# Patient Record
Sex: MALE | Race: OTHER | NOT HISPANIC OR LATINO | URBAN - METROPOLITAN AREA
[De-identification: names, ages, dates, MRNs, and addresses within clinical notes are randomized per-mention and may not be internally consistent; named-entity substitution may affect disease eponyms.]

---

## 2019-09-01 ENCOUNTER — OUTPATIENT (OUTPATIENT)
Dept: OUTPATIENT SERVICES | Facility: HOSPITAL | Age: 64
LOS: 1 days | End: 2019-09-01
Payer: MEDICAID

## 2019-09-01 PROCEDURE — G9001: CPT

## 2019-09-04 ENCOUNTER — EMERGENCY (EMERGENCY)
Facility: HOSPITAL | Age: 64
LOS: 1 days | Discharge: HOME | End: 2019-09-04
Attending: STUDENT IN AN ORGANIZED HEALTH CARE EDUCATION/TRAINING PROGRAM | Admitting: STUDENT IN AN ORGANIZED HEALTH CARE EDUCATION/TRAINING PROGRAM
Payer: MEDICAID

## 2019-09-04 VITALS — DIASTOLIC BLOOD PRESSURE: 90 MMHG | HEART RATE: 78 BPM | RESPIRATION RATE: 16 BRPM | SYSTOLIC BLOOD PRESSURE: 149 MMHG

## 2019-09-04 VITALS
DIASTOLIC BLOOD PRESSURE: 66 MMHG | SYSTOLIC BLOOD PRESSURE: 105 MMHG | TEMPERATURE: 98 F | HEART RATE: 72 BPM | OXYGEN SATURATION: 99 % | RESPIRATION RATE: 17 BRPM

## 2019-09-04 LAB
ALBUMIN SERPL ELPH-MCNC: 4.6 G/DL — SIGNIFICANT CHANGE UP (ref 3.3–5)
ALP SERPL-CCNC: 196 U/L — HIGH (ref 40–120)
ALT FLD-CCNC: 141 U/L — HIGH (ref 4–41)
AMPHET UR-MCNC: POSITIVE — SIGNIFICANT CHANGE UP
ANION GAP SERPL CALC-SCNC: 13 MMO/L — SIGNIFICANT CHANGE UP (ref 7–14)
APAP SERPL-MCNC: < 15 UG/ML — LOW (ref 15–25)
APPEARANCE UR: CLEAR — SIGNIFICANT CHANGE UP
AST SERPL-CCNC: 86 U/L — HIGH (ref 4–40)
BACTERIA # UR AUTO: SIGNIFICANT CHANGE UP
BARBITURATES UR SCN-MCNC: POSITIVE — SIGNIFICANT CHANGE UP
BASE EXCESS BLDV CALC-SCNC: 5 MMOL/L — SIGNIFICANT CHANGE UP
BASOPHILS # BLD AUTO: 0.05 K/UL — SIGNIFICANT CHANGE UP (ref 0–0.2)
BASOPHILS NFR BLD AUTO: 0.7 % — SIGNIFICANT CHANGE UP (ref 0–2)
BENZODIAZ UR-MCNC: NEGATIVE — SIGNIFICANT CHANGE UP
BILIRUB SERPL-MCNC: < 0.2 MG/DL — LOW (ref 0.2–1.2)
BILIRUB UR-MCNC: NEGATIVE — SIGNIFICANT CHANGE UP
BLOOD GAS VENOUS - CREATININE: 1.07 MG/DL — SIGNIFICANT CHANGE UP (ref 0.5–1.3)
BLOOD UR QL VISUAL: HIGH
BUN SERPL-MCNC: 24 MG/DL — HIGH (ref 7–23)
CALCIUM SERPL-MCNC: 10 MG/DL — SIGNIFICANT CHANGE UP (ref 8.4–10.5)
CANNABINOIDS UR-MCNC: NEGATIVE — SIGNIFICANT CHANGE UP
CHLORIDE BLDV-SCNC: 103 MMOL/L — SIGNIFICANT CHANGE UP (ref 96–108)
CHLORIDE SERPL-SCNC: 99 MMOL/L — SIGNIFICANT CHANGE UP (ref 98–107)
CO2 SERPL-SCNC: 28 MMOL/L — SIGNIFICANT CHANGE UP (ref 22–31)
COCAINE METAB.OTHER UR-MCNC: NEGATIVE — SIGNIFICANT CHANGE UP
COLOR SPEC: YELLOW — SIGNIFICANT CHANGE UP
CREAT SERPL-MCNC: 1.08 MG/DL — SIGNIFICANT CHANGE UP (ref 0.5–1.3)
EOSINOPHIL # BLD AUTO: 0.18 K/UL — SIGNIFICANT CHANGE UP (ref 0–0.5)
EOSINOPHIL NFR BLD AUTO: 2.4 % — SIGNIFICANT CHANGE UP (ref 0–6)
ETHANOL BLD-MCNC: < 10 MG/DL — SIGNIFICANT CHANGE UP
GAS PNL BLDV: 138 MMOL/L — SIGNIFICANT CHANGE UP (ref 136–146)
GLUCOSE BLDV-MCNC: 93 MG/DL — SIGNIFICANT CHANGE UP (ref 70–99)
GLUCOSE SERPL-MCNC: 91 MG/DL — SIGNIFICANT CHANGE UP (ref 70–99)
GLUCOSE UR-MCNC: NEGATIVE — SIGNIFICANT CHANGE UP
HCO3 BLDV-SCNC: 26 MMOL/L — SIGNIFICANT CHANGE UP (ref 20–27)
HCT VFR BLD CALC: 41.1 % — SIGNIFICANT CHANGE UP (ref 39–50)
HCT VFR BLDV CALC: 44.7 % — SIGNIFICANT CHANGE UP (ref 39–51)
HGB BLD-MCNC: 13 G/DL — SIGNIFICANT CHANGE UP (ref 13–17)
HGB BLDV-MCNC: 14.6 G/DL — SIGNIFICANT CHANGE UP (ref 13–17)
HYALINE CASTS # UR AUTO: NEGATIVE — SIGNIFICANT CHANGE UP
IMM GRANULOCYTES NFR BLD AUTO: 0.3 % — SIGNIFICANT CHANGE UP (ref 0–1.5)
KETONES UR-MCNC: NEGATIVE — SIGNIFICANT CHANGE UP
LACTATE BLDV-MCNC: 1.8 MMOL/L — SIGNIFICANT CHANGE UP (ref 0.5–2)
LEUKOCYTE ESTERASE UR-ACNC: NEGATIVE — SIGNIFICANT CHANGE UP
LYMPHOCYTES # BLD AUTO: 1.76 K/UL — SIGNIFICANT CHANGE UP (ref 1–3.3)
LYMPHOCYTES # BLD AUTO: 23.4 % — SIGNIFICANT CHANGE UP (ref 13–44)
MCHC RBC-ENTMCNC: 30.2 PG — SIGNIFICANT CHANGE UP (ref 27–34)
MCHC RBC-ENTMCNC: 31.6 % — LOW (ref 32–36)
MCV RBC AUTO: 95.6 FL — SIGNIFICANT CHANGE UP (ref 80–100)
METHADONE UR-MCNC: NEGATIVE — SIGNIFICANT CHANGE UP
MONOCYTES # BLD AUTO: 0.39 K/UL — SIGNIFICANT CHANGE UP (ref 0–0.9)
MONOCYTES NFR BLD AUTO: 5.2 % — SIGNIFICANT CHANGE UP (ref 2–14)
NEUTROPHILS # BLD AUTO: 5.13 K/UL — SIGNIFICANT CHANGE UP (ref 1.8–7.4)
NEUTROPHILS NFR BLD AUTO: 68 % — SIGNIFICANT CHANGE UP (ref 43–77)
NITRITE UR-MCNC: NEGATIVE — SIGNIFICANT CHANGE UP
NRBC # FLD: 0 K/UL — SIGNIFICANT CHANGE UP (ref 0–0)
OPIATES UR-MCNC: NEGATIVE — SIGNIFICANT CHANGE UP
OXYCODONE UR-MCNC: NEGATIVE — SIGNIFICANT CHANGE UP
PCO2 BLDV: 57 MMHG — HIGH (ref 41–51)
PCP UR-MCNC: NEGATIVE — SIGNIFICANT CHANGE UP
PH BLDV: 7.35 PH — SIGNIFICANT CHANGE UP (ref 7.32–7.43)
PH UR: 6 — SIGNIFICANT CHANGE UP (ref 5–8)
PLATELET # BLD AUTO: 224 K/UL — SIGNIFICANT CHANGE UP (ref 150–400)
PMV BLD: 11.1 FL — SIGNIFICANT CHANGE UP (ref 7–13)
PO2 BLDV: < 24 MMHG — LOW (ref 35–40)
POTASSIUM BLDV-SCNC: 4.2 MMOL/L — SIGNIFICANT CHANGE UP (ref 3.4–4.5)
POTASSIUM SERPL-MCNC: 4.4 MMOL/L — SIGNIFICANT CHANGE UP (ref 3.5–5.3)
POTASSIUM SERPL-SCNC: 4.4 MMOL/L — SIGNIFICANT CHANGE UP (ref 3.5–5.3)
PROT SERPL-MCNC: 8.6 G/DL — HIGH (ref 6–8.3)
PROT UR-MCNC: 30 — SIGNIFICANT CHANGE UP
RBC # BLD: 4.3 M/UL — SIGNIFICANT CHANGE UP (ref 4.2–5.8)
RBC # FLD: 13.8 % — SIGNIFICANT CHANGE UP (ref 10.3–14.5)
RBC CASTS # UR COMP ASSIST: >50 — HIGH (ref 0–?)
SALICYLATES SERPL-MCNC: < 5 MG/DL — LOW (ref 15–30)
SAO2 % BLDV: 34.9 % — LOW (ref 60–85)
SODIUM SERPL-SCNC: 140 MMOL/L — SIGNIFICANT CHANGE UP (ref 135–145)
SP GR SPEC: 1.03 — SIGNIFICANT CHANGE UP (ref 1–1.04)
SQUAMOUS # UR AUTO: SIGNIFICANT CHANGE UP
T3 SERPL-MCNC: 54.2 NG/DL — LOW (ref 80–200)
T4 AB SER-ACNC: 4.73 UG/DL — LOW (ref 5.1–13)
TSH SERPL-MCNC: 2.63 UIU/ML — SIGNIFICANT CHANGE UP (ref 0.27–4.2)
UROBILINOGEN FLD QL: SIGNIFICANT CHANGE UP
WBC # BLD: 7.53 K/UL — SIGNIFICANT CHANGE UP (ref 3.8–10.5)
WBC # FLD AUTO: 7.53 K/UL — SIGNIFICANT CHANGE UP (ref 3.8–10.5)
WBC UR QL: SIGNIFICANT CHANGE UP (ref 0–?)

## 2019-09-04 PROCEDURE — 76937 US GUIDE VASCULAR ACCESS: CPT | Mod: 26,RT

## 2019-09-04 PROCEDURE — 71045 X-RAY EXAM CHEST 1 VIEW: CPT | Mod: 26

## 2019-09-04 PROCEDURE — 70450 CT HEAD/BRAIN W/O DYE: CPT | Mod: 26

## 2019-09-04 PROCEDURE — 99285 EMERGENCY DEPT VISIT HI MDM: CPT | Mod: 25

## 2019-09-04 RX ORDER — OLANZAPINE 15 MG/1
1 TABLET, FILM COATED ORAL
Qty: 5 | Refills: 0
Start: 2019-09-04 | End: 2019-09-08

## 2019-09-04 RX ORDER — QUETIAPINE FUMARATE 200 MG/1
2 TABLET, FILM COATED ORAL
Qty: 20 | Refills: 0
Start: 2019-09-04 | End: 2019-09-08

## 2019-09-04 RX ORDER — HALOPERIDOL DECANOATE 100 MG/ML
1 INJECTION INTRAMUSCULAR
Qty: 15 | Refills: 0
Start: 2019-09-04 | End: 2019-09-08

## 2019-09-04 RX ORDER — HALOPERIDOL DECANOATE 100 MG/ML
5 INJECTION INTRAMUSCULAR ONCE
Refills: 0 | Status: COMPLETED | OUTPATIENT
Start: 2019-09-04 | End: 2019-09-04

## 2019-09-04 RX ORDER — TRAZODONE HCL 50 MG
1 TABLET ORAL
Qty: 5 | Refills: 0
Start: 2019-09-04 | End: 2019-09-08

## 2019-09-04 RX ORDER — MIRTAZAPINE 45 MG/1
1 TABLET, ORALLY DISINTEGRATING ORAL
Qty: 5 | Refills: 0
Start: 2019-09-04 | End: 2019-09-08

## 2019-09-04 RX ADMIN — Medication 1 MILLIGRAM(S): at 14:55

## 2019-09-04 RX ADMIN — HALOPERIDOL DECANOATE 5 MILLIGRAM(S): 100 INJECTION INTRAMUSCULAR at 14:55

## 2019-09-04 NOTE — ED PROVIDER NOTE - NSFOLLOWUPINSTRUCTIONS_ED_ALL_ED_FT
Please follow up with geriatric psychiatry in the next few days. Call the number provided to you to make an appointment.    Continue all home medications as prescribed.    Return to the ED for any worsening symptoms of chest pain, difficulty breathing, thoughts of hurting yourself or others, or any new or concerning symptoms.    Please read all attached.

## 2019-09-04 NOTE — ED ADULT NURSE NOTE - CHIEF COMPLAINT QUOTE
Pt went to appt at [facility], unable to be seen since he is not pt of [facility], needs full medical eval and clearance. PMH bipolar, HTN, DM, CVAx2, nonverbal at baseline. At baseline mental status as per wife. No complaints.

## 2019-09-04 NOTE — ED PROVIDER NOTE - NS ED ROS FT
General: no fevers  Head: no injury  Eyes: no eye redness  ENT: no nasal discharge/congestion, +left ear swelling with negative work up  CV: no cyanosis  Resp: no SOB, no cough  GI: no N/V  : normal urine output  MSK: no bone deformities  Skin: no new rash  Neuro: no focal weakness

## 2019-09-04 NOTE — ED PROVIDER NOTE - OBJECTIVE STATEMENT
62yo man PMH dementia, schizophrenia, bipolar, hypothyroidism, CVA, HTN, HLD, DM with PEG tube in place due to dysphagia was brought into ED by wife for medical evaluation as she would like pt to be evaluated by psychiatry. He is nonverbal at baseline and pt's wife is at bedside and reports that he is at baseline. He has been having more aggressive behavior and has poor sleep. Pt had been treated by getriatrician with psychiatric medications but was told that he would need a psychiatrist and has not been prescribing psych medications. She normally takes pt to [facility] but was referred to [facility] and [place] she went [place] [facility] [place] told that he would need a full medication evaluation prior to being seen. Pt's wife denies noticing any recent fevers, cough, n/v, rash.

## 2019-09-04 NOTE — ED ADULT NURSE NOTE - NSIMPLEMENTINTERV_GEN_ALL_ED
Implemented All Fall Risk Interventions:  [place] to call system. Call bell, personal items and telephone within reach. Instruct patient to call for assistance. Room bathroom lighting operational. Non-slip footwear when patient is off stretcher. Physically safe environment: no spills, clutter or unnecessary equipment. Stretcher in lowest position, wheels locked, appropriate side rails in place. Provide visual cue, wrist band, yellow gown, etc. Monitor gait and stability. Monitor for mental status changes and reorient to person, place, and time. Review medications for side effects contributing to fall risk. Reinforce activity limits and safety measures with patient and family.

## 2019-09-04 NOTE — ED PROVIDER NOTE - ATTENDING CONTRIBUTION TO CARE
62 yo male brought to ED for evaluation of progressively worsening aggressive behavior, poor sleep.   MDM  Will check labs, EKG, imaging, medicate, consult psych

## 2019-09-04 NOTE — ED PROVIDER NOTE - PMH
Bipolar disorder    CVA (cerebral vascular accident)    DM (diabetes mellitus)    HLD (hyperlipidemia)    HTN (hypertension)    Hypothyroidism    PEG (percutaneous endoscopic gastrostomy) status    Seizure

## 2019-09-04 NOTE — ED PROVIDER NOTE - PATIENT PORTAL LINK FT
You can access the FollowMyHealth Patient Portal offered by [facility] by registering at the following website: [email or web address]. By joining [facility]’s FollowMyHealth portal, you will also be able to view your health information using other applications (apps) compatible with our system.

## 2019-09-04 NOTE — ED PROVIDER NOTE - PROGRESS NOTE DETAILS
[place]: Called by  re: this patient. He was dropped from his PMD, ran out of meds. Dr. Uribe of the crisis center sent patient to the ED because he was uncomfortable providing long term medications without baseline blood tests. Tanya Mensah, resident MD: spoke with  and they will have SW speak with wife for f/u with psychiatry. Tanya Mensah, resident MD: pt's wife spoke with SW who was able to give her information for f/u for geriatric psychiatry. will give psych medications for the next 5 days to allow them time to make appointment. will discharge patient home at this time. printed out copies of results for patient to take home. discussed return precautions and need for outpatient follow up.

## 2019-09-04 NOTE — ED ADULT NURSE NOTE - OBJECTIVE STATEMENT
64yo man PMH dementia, schizophrenia, bipolar, hypothyroidism, CVA, HTN, HLD, DM with PEG tube in place due to dysphagia was brought into ED by wife for medical evaluation as she would like pt to be evaluated by psychiatry. He is nonverbal at baseline and pt's wife is at bedside and reports that he is at baseline. He has been having more aggressive behavior and has poor sleep. Pt had been treated by getriatrician with psychiatric medications but was told that he would need a psychiatrist and has not been prescribing psych medications. She normally takes pt to [facility] but was referred to [facility] and when she [place] [facility] [place] told that he would need a full medication evaluation prior to being seen. Pt's wife denies noticing any recent fevers, cough, n/v, rash.  Pt arrived intermittently agitated, restless, moaning/yelling, cachetic. Skin dry and intact, resp even and unlabored. Pt medicated for safety. + peg

## 2019-09-04 NOTE — ED PROVIDER NOTE - PHYSICAL EXAMINATION
General: chronically ill appearing man in no acute distress, not verbally responsive or interactive  Head: normocephalic, atraumatic  Eyes: clear eyes with no discharge  Mouth: moist mucous membranes  Neck: supple neck  CV: normal rate and rhythm, no LE edema, peripheral pulses 2+ bilaterally  Respiratory: clear to auscultation bilaterally  Abdomen: soft, nontender, nondistended, PEG in place with no surround erythema or purulent discharge from insertion site  : no suprapubic tenderness, no CVAT  Neuro: alert and oriented x0, moving all extremities spontaneously to painful stimuli  Skin: no rash noted  Extremities: full ROM, no tenderness to palpation of joints

## 2019-09-04 NOTE — ED PROVIDER NOTE - CLINICAL SUMMARY MEDICAL DECISION MAKING FREE TEXT BOX
62yo man brought in to ED with complaint of more aggressive behavior and poor sleep with psychiatric history with no medications due to no psychiatrist requiring psych evaluation but referred for full medical work up. Will check for metabolic/infectious/neurologic abnormalities, will check cbc, cmp, tsh, ecg, ua, tox screen, cxr, CTH. will continue to reassess and obtain psych eval.

## 2019-09-04 NOTE — ED ADULT TRIAGE NOTE - CHIEF COMPLAINT QUOTE
Pt went to appt at [facility], unable to be seen since he is not pt of [facility], needs full medical eval and clearance. PMH bipolar, HTN, DM, CVAx2, nonverbal at baseline. At baseline mental status as per wife. No complaints.

## 2019-09-05 LAB — SPECIMEN SOURCE: SIGNIFICANT CHANGE UP

## 2019-09-06 DIAGNOSIS — Z71.89 OTHER SPECIFIED COUNSELING: ICD-10-CM

## 2019-09-06 LAB — BACTERIA UR CULT: SIGNIFICANT CHANGE UP

## 2019-09-06 NOTE — ED BEHAVIORAL HEALTH NOTE - BEHAVIORAL HEALTH NOTE
Worker called the [facility] clinic and was provided  apt 9/12/19 @1:30pm with Valeria Morse  for patient. address [phone], [facility]. worker called  patient [phone] and line was busy; unable to leave voicemail.

## 2019-09-08 NOTE — ED BEHAVIORAL HEALTH NOTE - BEHAVIORAL HEALTH NOTE
FOLLOW UP:     Pt contacted at [phone]. Writer able to speak with wife as pt is non-verbal. Wife accepted appointment at [facility] on 9/12/19 @1:30pm with Valeria Morse. Address provided was [address] [place] [address]. Phone number provided. No additional questions or concerns reported.

## 2019-09-11 PROBLEM — E78.5 HYPERLIPIDEMIA, UNSPECIFIED: Chronic | Status: ACTIVE | Noted: 2019-09-04

## 2019-09-11 PROBLEM — I10 ESSENTIAL (PRIMARY) HYPERTENSION: Chronic | Status: ACTIVE | Noted: 2019-09-04

## 2019-09-11 PROBLEM — E11.9 TYPE 2 DIABETES MELLITUS WITHOUT COMPLICATIONS: Chronic | Status: ACTIVE | Noted: 2019-09-04

## 2019-09-11 PROBLEM — R56.9 UNSPECIFIED CONVULSIONS: Chronic | Status: ACTIVE | Noted: 2019-09-04

## 2019-09-11 PROBLEM — E03.9 HYPOTHYROIDISM, UNSPECIFIED: Chronic | Status: ACTIVE | Noted: 2019-09-04

## 2019-09-11 PROBLEM — Z93.1 GASTROSTOMY STATUS: Chronic | Status: ACTIVE | Noted: 2019-09-04

## 2019-09-11 PROBLEM — F31.9 BIPOLAR DISORDER, UNSPECIFIED: Chronic | Status: ACTIVE | Noted: 2019-09-04

## 2019-09-11 PROBLEM — I63.9 CEREBRAL INFARCTION, UNSPECIFIED: Chronic | Status: ACTIVE | Noted: 2019-09-04

## 2019-09-12 ENCOUNTER — OUTPATIENT (OUTPATIENT)
Dept: OUTPATIENT SERVICES | Facility: HOSPITAL | Age: 64
LOS: 1 days | Discharge: HOME | End: 2019-09-12

## 2019-09-13 DIAGNOSIS — F20.9 SCHIZOPHRENIA, UNSPECIFIED: ICD-10-CM

## 2022-05-05 NOTE — ED PROCEDURE NOTE - PROCEDURE DATE TIME, MLM
04-Sep-2019 16:32 Provider Procedure Text (A): After obtaining clear surgical margins the defect was repaired by another provider.

## 2024-03-24 NOTE — ED BEHAVIORAL HEALTH NOTE - BEHAVIORAL HEALTH NOTE
Writer met with pt's spouse, Lizbeth Fish, at bedside. Phone number provided by spouse is [phone] (okay to leave ). Spouse is pt's HCP and primary caregiver. Pt has hx of Dementia and is said to be non-verbal for last 5 years. Spouse had issues/concern regarding pt's med refills. Spouse was instructed by pt's outpatient provider to bring pt to [facility] identifying that pt was in need of psychiatric provider for continued med. management. Spouse reports that Crisis Center referred pt to ED for medical clearance and lab work as pt is not an established pt and they needed completed testing prior to treating pt. Spouse informed that once medically cleared pt could be provided appointment at crisis center for further treatment. Spouse verbalized importance of follow up for pt to receive proper treatment. Pt to be added to  log for SW support and follow up. Spouse is primary contact due to pt's cognitive limitations and dementia dx. [facility] pamphlet and tony clinic contact information provided. Performed